# Patient Record
Sex: FEMALE | Race: WHITE | NOT HISPANIC OR LATINO | Employment: FULL TIME | URBAN - NONMETROPOLITAN AREA
[De-identification: names, ages, dates, MRNs, and addresses within clinical notes are randomized per-mention and may not be internally consistent; named-entity substitution may affect disease eponyms.]

---

## 2017-10-12 ENCOUNTER — TELEPHONE (OUTPATIENT)
Dept: CARDIOLOGY | Facility: CLINIC | Age: 27
End: 2017-10-12

## 2017-10-12 NOTE — TELEPHONE ENCOUNTER
Per Opal Rogers, APRN: she can cut atenolol 1/2 for 3 days and then she can stop. If she does not have palpitations after stopping medications she can stay off any type of medications.  If she starts having palpitations she needs to call our office so we can start her on something else.

## 2017-10-12 NOTE — TELEPHONE ENCOUNTER
----- Message from Toya Redding LPN sent at 10/12/2017  4:53 PM EDT -----   Patient is requesting to speak with someone about one of her medications.

## 2017-11-08 ENCOUNTER — LAB REQUISITION (OUTPATIENT)
Dept: LAB | Facility: HOSPITAL | Age: 27
End: 2017-11-08

## 2017-11-08 DIAGNOSIS — K80.20 CALCULUS OF GALLBLADDER WITHOUT CHOLECYSTITIS WITHOUT OBSTRUCTION: ICD-10-CM

## 2017-11-08 PROCEDURE — 88304 TISSUE EXAM BY PATHOLOGIST: CPT | Performed by: SURGERY

## 2017-11-10 LAB
CYTO UR: NORMAL
LAB AP CASE REPORT: NORMAL
LAB AP CLINICAL INFORMATION: NORMAL
Lab: NORMAL
PATH REPORT.FINAL DX SPEC: NORMAL
PATH REPORT.GROSS SPEC: NORMAL

## 2017-12-18 ENCOUNTER — OFFICE VISIT (OUTPATIENT)
Dept: CARDIOLOGY | Facility: CLINIC | Age: 27
End: 2017-12-18

## 2017-12-18 VITALS
OXYGEN SATURATION: 96 % | HEART RATE: 80 BPM | SYSTOLIC BLOOD PRESSURE: 104 MMHG | WEIGHT: 131.2 LBS | HEIGHT: 65 IN | DIASTOLIC BLOOD PRESSURE: 67 MMHG | BODY MASS INDEX: 21.86 KG/M2

## 2017-12-18 DIAGNOSIS — R00.2 PALPITATIONS: Primary | ICD-10-CM

## 2017-12-18 DIAGNOSIS — R00.0 TACHYCARDIA: ICD-10-CM

## 2017-12-18 PROBLEM — G35 MS (MULTIPLE SCLEROSIS) (HCC): Status: ACTIVE | Noted: 2017-12-18

## 2017-12-18 PROCEDURE — 93000 ELECTROCARDIOGRAM COMPLETE: CPT | Performed by: NURSE PRACTITIONER

## 2017-12-18 PROCEDURE — 99212 OFFICE O/P EST SF 10 MIN: CPT | Performed by: NURSE PRACTITIONER

## 2017-12-18 RX ORDER — BIOTIN 10 MG
TABLET ORAL
COMMUNITY
End: 2019-08-22 | Stop reason: SDDI

## 2017-12-18 RX ORDER — ATENOLOL 25 MG/1
25 TABLET ORAL DAILY
Qty: 90 TABLET | Refills: 3 | Status: SHIPPED | OUTPATIENT
Start: 2017-12-18 | End: 2018-08-08 | Stop reason: SDUPTHER

## 2017-12-18 RX ORDER — LANOLIN ALCOHOL/MO/W.PET/CERES
400 CREAM (GRAM) TOPICAL 2 TIMES DAILY
COMMUNITY

## 2017-12-18 RX ORDER — MELATONIN
2000 DAILY
COMMUNITY

## 2017-12-18 RX ORDER — DIMETHYL FUMARATE 240 MG/1
240 CAPSULE ORAL 2 TIMES DAILY
COMMUNITY
Start: 2017-12-04

## 2017-12-18 NOTE — PATIENT INSTRUCTIONS
Palpitations  A palpitation is the feeling that your heartbeat is irregular or is faster than normal. It may feel like your heart is fluttering or skipping a beat. Palpitations are usually not a serious problem. They may be caused by many things, including smoking, caffeine, alcohol, stress, and certain medicines. Although most causes of palpitations are not serious, palpitations can be a sign of a serious medical problem. In some cases, you may need further medical evaluation.  HOME CARE INSTRUCTIONS  Pay attention to any changes in your symptoms. Take these actions to help with your condition:  · Avoid the following:    Caffeinated coffee, tea, soft drinks, diet pills, and energy drinks.    Chocolate.    Alcohol.  · Do not use any tobacco products, such as cigarettes, chewing tobacco, and e-cigarettes. If you need help quitting, ask your health care provider.  · Try to reduce your stress and anxiety. Things that can help you relax include:    Yoga.    Meditation.    Physical activity, such as swimming, jogging, or walking.    Biofeedback. This is a method that helps you learn to use your mind to control things in your body, such as your heartbeats.  · Get plenty of rest and sleep.  · Take over-the-counter and prescription medicines only as told by your health care provider.  · Keep all follow-up visits as told by your health care provider. This is important.  SEEK MEDICAL CARE IF:  · You continue to have a fast or irregular heartbeat after 24 hours.  · Your palpitations occur more often.  SEEK IMMEDIATE MEDICAL CARE IF:  · You have chest pain or shortness of breath.  · You have a severe headache.  · You feel dizzy or you faint.     This information is not intended to replace advice given to you by your health care provider. Make sure you discuss any questions you have with your health care provider.     Document Released: 12/15/2001 Document Revised: 04/10/2017 Document Reviewed: 09/01/2016  [employer]  Patient Education ©2017 [employer] Inc.  Multiple Sclerosis  Multiple sclerosis (MS) is a disease of the central nervous system. It leads to the loss of the insulating covering of the nerves (myelin sheath) of your brain. When this happens, brain signals do not get sent properly or may not get sent at all. The age of onset of MS varies.   CAUSES  The cause of MS is unknown. However, it is more common in the [place] than in the southern [place].  RISK FACTORS  There is a higher number of women with MS than men. MS is not an illness that is passed down to you from your family members (inherited). However, your risk of MS is higher if you have a relative with MS.  SIGNS AND SYMPTOMS   The symptoms of MS occur in episodes or attacks. These attacks may last weeks to months. There may be long periods of almost no symptoms between attacks. The symptoms of MS vary. This is because of the many different ways it affects the central nervous system. The main symptoms of MS include:  · Vision problems and eye pain.  · Numbness.  · Weakness.  · Inability to move your arms, hands, feet, or legs (paralysis).  · Balance problems.  · Tremors.  DIAGNOSIS   Your health care provider can diagnose MS with the help of imaging exams and lab tests. These may include specialized X-ray exams and spinal fluid tests. The best imaging exam to confirm a diagnosis of MS is an MRI.  TREATMENT   There is no known cure for MS, but there are medicines that can decrease the number and frequency of attacks. Steroids are often used for short-term relief. Physical and occupational therapy may also help. There are also many new alternative or complementary treatments available to help control the symptoms of MS. Ask your health care provider if any of these other options are right for you.  HOME CARE INSTRUCTIONS   · Take medicines as directed by your health care provider.  · Exercise as directed by your health care provider.  SEEK  MEDICAL CARE IF:  You begin to feel depressed.  SEEK IMMEDIATE MEDICAL CARE IF:  · You develop paralysis.  · You have problems with bladder, bowel, or sexual function.  · You develop mental changes, such as forgetfulness or mood swings.  · You have a period of uncontrolled movements (seizure).     This information is not intended to replace advice given to you by your health care provider. Make sure you discuss any questions you have with your health care provider.     Document Released: 12/15/2001 Document Revised: 12/23/2014 Document Reviewed: 08/25/2014  [employer] Interactive Patient Education ©2017 [employer]

## 2017-12-18 NOTE — PROGRESS NOTES
Subjective   Mike Aragon is a 27 y.o. female     Chief Complaint   Patient presents with   • Palpitations     presents for a follow up       HPI    Problem List:    1. MVP, per patient report  1.1 Echo 6/21/16 - EF 55-60%; trace MR and TR  2. Atypical Chest Pain  2.1 Stress Test 6/21/16 - no ischemia; average exercise   3. Palpitations  3.1 Event Monitor 5/10-5/23/16 - NSR, sinus arrhythmia, ST   4. Shortness of breath with exertion  5. MS, Dr. Gerald Tracy, DX 4/2017    Patient is a 27-year-old female who presents today for follow-up.  She denies any chest pain, pressure, palpitations, fluttering, presyncope, syncope, orthopnea, PND or edema.  She said she did have a little bit of dizziness a few weeks ago and has a history of vertigo.  She says it has since resolved.  She denies any shortness of breath with activity.  PCP monitors her cholesterol.  Patient's mother past away and made due to a brain aneurysm.  Patient herself, was diagnosed with MS in April of this year.  She says it started with feet numbness and then tingling in her legs.  She says she would have a electrical pain when she bent her neck.      Current Outpatient Prescriptions   Medication Sig Dispense Refill   • atenolol (TENORMIN) 25 MG tablet Take 1 tablet by mouth Daily. 90 tablet 3   • Biotin 10 MG tablet Take  by mouth.     • cholecalciferol (VITAMIN D3) 1000 units tablet Take 1,000 Units by mouth Daily.     • folic acid (FOLVITE) 400 MCG tablet Take 400 mcg by mouth Daily.     • nitroglycerin (NITROSTAT) 0.4 MG SL tablet 1 under the tongue as needed for angina, may repeat q5mins for up three doses 100 tablet 11   • SPRINTEC 28 0.25-35 MG-MCG per tablet Take 1 tablet by mouth daily.     • TECFIDERA 240 MG capsule delayed-release Take 240 mg by mouth 2 (Two) Times a Day.       No current facility-administered medications for this visit.        ALLERGIES    Macrobid [nitrofurantoin monohyd macro] and Polytrim [polymyxin  "b-trimethoprim]    Past Medical History:   Diagnosis Date   • Mitral valve prolapse        Social History     Social History   • Marital status: Single     Spouse name: N/A   • Number of children: N/A   • Years of education: N/A     Occupational History   • Not on file.     Social History Main Topics   • Smoking status: Never Smoker   • Smokeless tobacco: Never Used   • Alcohol use Yes      Comment: Socially twice a month   • Drug use: No   • Sexual activity: Not on file     Other Topics Concern   • Not on file     Social History Narrative       Family History   Problem Relation Age of Onset   • Hypertension Mother    • No Known Problems Father        Review of Systems   Constitutional: Negative for diaphoresis and fatigue.   HENT: Negative for rhinorrhea, sinus pressure and sneezing.    Eyes: Negative for visual disturbance.   Respiratory: Negative for chest tightness and shortness of breath.    Cardiovascular: Negative for chest pain, palpitations and leg swelling.   Gastrointestinal: Positive for diarrhea. Negative for constipation, nausea and vomiting.   Endocrine: Negative.    Genitourinary: Negative for difficulty urinating.   Musculoskeletal: Positive for arthralgias, back pain and neck pain. Negative for myalgias.        Diagnosed with MS April 2017   Skin: Negative.    Allergic/Immunologic: Positive for environmental allergies.   Neurological: Positive for dizziness (few weeks ago, history of vertigo ) and numbness (feet and tingling in legs). Negative for syncope and headaches.   Hematological: Does not bruise/bleed easily.   Psychiatric/Behavioral: The patient is nervous/anxious.        Objective   /67 (BP Location: Left arm, Patient Position: Sitting)  Pulse 80  Ht 165.1 cm (65\")  Wt 59.5 kg (131 lb 3.2 oz)  SpO2 96%  BMI 21.83 kg/m2  Vitals:    12/18/17 0904   BP: 104/67   BP Location: Left arm   Patient Position: Sitting   Pulse: 80   SpO2: 96%   Weight: 59.5 kg (131 lb 3.2 oz)   Height: " "165.1 cm (65\")      Lab Results (most recent)     None        Physical Exam   Constitutional: She is oriented to person, place, and time. Vital signs are normal. She appears well-developed and well-nourished. She is active and cooperative.   HENT:   Head: Normocephalic.   Eyes: Lids are normal.   Neck: Normal carotid pulses, no hepatojugular reflux and no JVD present. Carotid bruit is not present.   Cardiovascular: Normal rate, regular rhythm and normal heart sounds.    Pulses:       Radial pulses are 2+ on the right side, and 2+ on the left side.        Dorsalis pedis pulses are 2+ on the right side, and 2+ on the left side.        Posterior tibial pulses are 2+ on the right side, and 2+ on the left side.   No edema BLE.    Pulmonary/Chest: Effort normal and breath sounds normal.   Abdominal: Normal appearance and bowel sounds are normal.   Neurological: She is alert and oriented to person, place, and time.   Skin: Skin is warm, dry and intact.   Psychiatric: She has a normal mood and affect. Her speech is normal and behavior is normal. Judgment and thought content normal. Cognition and memory are normal.       Procedure     ECG 12 Lead  Date/Time: 12/18/2017 9:07 AM  Performed by: JOCELYN VELA  Authorized by: JOCELYN VELA   Comparison: compared with previous ECG from 5/9/2016  Similar to previous ECG  Rhythm: sinus rhythm  Rhythm comments: sinus arrythmia   Rate: normal  BPM: 68  QRS axis: normal  Clinical impression: non-specific ECG  Comments: Palpitations                    Assessment/Plan      Diagnosis Plan   1. Palpitations  ECG 12 Lead    atenolol (TENORMIN) 25 MG tablet   2. Tachycardia         Return in about 1 year (around 12/18/2018).       outpatient/tachycardia-patient is on atenolol.  She will continue her medication regimen.  She'll follow-up in one year or sooner if any changes.    Electronically signed by:        "

## 2018-05-07 ENCOUNTER — CLINICAL SUPPORT (OUTPATIENT)
Dept: CARDIOLOGY | Facility: CLINIC | Age: 28
End: 2018-05-07

## 2018-05-07 VITALS
DIASTOLIC BLOOD PRESSURE: 66 MMHG | BODY MASS INDEX: 21.66 KG/M2 | HEART RATE: 78 BPM | SYSTOLIC BLOOD PRESSURE: 98 MMHG | HEIGHT: 65 IN | OXYGEN SATURATION: 98 % | WEIGHT: 130 LBS

## 2018-05-07 DIAGNOSIS — R00.2 PALPITATIONS: Primary | ICD-10-CM

## 2018-05-07 NOTE — PROGRESS NOTES
Mike NEWTON Margo  1990 5/8/2018   ?   Chief Complaint   Patient presents with   • Palpitations     presents as a nurse visit       ?   HPI: patient walked into the office today with being very fatigued. Patient states she was seen in the ER for palpitations and elevated heart rate. The ER  Told her to take an extra 1/2 tablet of her atenolol to see if it will help. DINA Matthews wants an ekg while the patient is here today.   ?   ?   ?     Current Outpatient Prescriptions:   •  atenolol (TENORMIN) 25 MG tablet, Take 1 tablet by mouth Daily., Disp: 90 tablet, Rfl: 3  •  Biotin 10 MG tablet, Take  by mouth., Disp: , Rfl:   •  cholecalciferol (VITAMIN D3) 1000 units tablet, Take 1,000 Units by mouth Daily., Disp: , Rfl:   •  folic acid (FOLVITE) 400 MCG tablet, Take 400 mcg by mouth Daily., Disp: , Rfl:   •  nitroglycerin (NITROSTAT) 0.4 MG SL tablet, 1 under the tongue as needed for angina, may repeat q5mins for up three doses, Disp: 100 tablet, Rfl: 11  •  SPRINTEC 28 0.25-35 MG-MCG per tablet, Take 1 tablet by mouth daily., Disp: , Rfl:   •  TECFIDERA 240 MG capsule delayed-release, Take 240 mg by mouth 2 (Two) Times a Day., Disp: , Rfl:    ?   ?   Macrobid [nitrofurantoin monohyd macro] and Polytrim [polymyxin b-trimethoprim]         ECG 12 Lead  Date/Time: 5/8/2018 7:45 AM  Performed by: JOCELYN VELA  Authorized by: JOCELYN VELA   Comparison: not compared with previous ECG   Rhythm: sinus rhythm  Rate: normal  BPM: 63  QRS axis: normal  Clinical impression: normal ECG             ?   Assessment/Plan  Per DINA Matthews; patient is to stay on her atenolol 25mg. She is to take a tablet in am and if her systolic bp is 115 and above then she is to take a whole tablet in pm. If it's less than 115 then she is to take 1/2 tablet in pm. We will also order a 2 week cardiac even monitor for the patient. Patient states she has very sensitive skin.  She will follow-up in 12 weeks or sooner if any  changes.  Patient is unsure if this is her heart or just stress as this past week was one year from when her mother , suddenly.    ?   ?   ?

## 2018-05-08 PROCEDURE — 93000 ELECTROCARDIOGRAM COMPLETE: CPT | Performed by: NURSE PRACTITIONER

## 2018-05-23 ENCOUNTER — OUTSIDE FACILITY SERVICE (OUTPATIENT)
Dept: CARDIOLOGY | Facility: CLINIC | Age: 28
End: 2018-05-23

## 2018-05-23 PROCEDURE — 93272 ECG/REVIEW INTERPRET ONLY: CPT | Performed by: INTERNAL MEDICINE

## 2018-05-29 ENCOUNTER — TELEPHONE (OUTPATIENT)
Dept: CARDIOLOGY | Facility: CLINIC | Age: 28
End: 2018-05-29

## 2018-05-29 NOTE — TELEPHONE ENCOUNTER
PATIENT IS AWARE OF MONITOR RESULTS.  PATIENT VERBALIZED UNDERSTANDING AND WAS ENCOURAGED TO KEEP FOLLOW UP APPOINTMENT. THERESA ARZATE

## 2018-08-08 ENCOUNTER — OFFICE VISIT (OUTPATIENT)
Dept: CARDIOLOGY | Facility: CLINIC | Age: 28
End: 2018-08-08

## 2018-08-08 VITALS
OXYGEN SATURATION: 97 % | DIASTOLIC BLOOD PRESSURE: 67 MMHG | SYSTOLIC BLOOD PRESSURE: 104 MMHG | HEART RATE: 66 BPM | BODY MASS INDEX: 21.99 KG/M2 | HEIGHT: 65 IN | WEIGHT: 132 LBS

## 2018-08-08 DIAGNOSIS — R00.2 PALPITATIONS: Primary | ICD-10-CM

## 2018-08-08 DIAGNOSIS — R00.0 TACHYCARDIA: ICD-10-CM

## 2018-08-08 PROCEDURE — 99212 OFFICE O/P EST SF 10 MIN: CPT | Performed by: NURSE PRACTITIONER

## 2018-08-08 RX ORDER — ATENOLOL 25 MG/1
25 TABLET ORAL DAILY
Qty: 90 TABLET | Refills: 3 | Status: SHIPPED | OUTPATIENT
Start: 2018-08-08 | End: 2019-08-22 | Stop reason: SDUPTHER

## 2018-08-08 NOTE — PATIENT INSTRUCTIONS
Palpitations  A palpitation is the feeling that your heartbeat is irregular or is faster than normal. It may feel like your heart is fluttering or skipping a beat. Palpitations are usually not a serious problem. They may be caused by many things, including smoking, caffeine, alcohol, stress, and certain medicines. Although most causes of palpitations are not serious, palpitations can be a sign of a serious medical problem. In some cases, you may need further medical evaluation.  Follow these instructions at home:  Pay attention to any changes in your symptoms. Take these actions to help with your condition:  · Avoid the following:  ? Caffeinated coffee, tea, soft drinks, diet pills, and energy drinks.  ? Chocolate.  ? Alcohol.  · Do not use any tobacco products, such as cigarettes, chewing tobacco, and e-cigarettes. If you need help quitting, ask your health care provider.  · Try to reduce your stress and anxiety. Things that can help you relax include:  ? Yoga.  ? Meditation.  ? Physical activity, such as swimming, jogging, or walking.  ? Biofeedback. This is a method that helps you learn to use your mind to control things in your body, such as your heartbeats.  · Get plenty of rest and sleep.  · Take over-the-counter and prescription medicines only as told by your health care provider.  · Keep all follow-up visits as told by your health care provider. This is important.    Contact a health care provider if:  · You continue to have a fast or irregular heartbeat after 24 hours.  · Your palpitations occur more often.  Get help right away if:  · You have chest pain or shortness of breath.  · You have a severe headache.  · You feel dizzy or you faint.  This information is not intended to replace advice given to you by your health care provider. Make sure you discuss any questions you have with your health care provider.  Document Released: 12/15/2001 Document Revised: 05/22/2017 Document Reviewed: 09/01/2016  [employer]  Interactive Patient Education © 2018 [employer]

## 2018-08-08 NOTE — PROGRESS NOTES
Subjective   Mike Aragon is a 28 y.o. female     Chief Complaint   Patient presents with   • Palpitations     presens as a follow up       HPI    Problem List:    1. MVP, per patient report  1.1 Echo 6/21/16 - EF 55-60%; trace MR and TR  2. Atypical Chest Pain  2.1 Stress Test 6/21/16 - no ischemia; average exercise   3. Palpitations  3.1 Event Monitor 5/10-5/23/16 - NSR, sinus arrhythmia, ST   3.2 event monitor 5/10-5/23/18-sinus rhythm, sinus arrhythmia  4. Shortness of breath with exertion  5. MS, Dr. Gerald Tracy, DX 4/2017    Patient is a 28-year-old female who presents today for follow-up.  She denies any chest pain, pressure, dizziness, presyncope, syncope, orthopnea, PND or edema.  She says she still has a palpitations but now she really thinks it's due to anxiety.  She says that the beta blocker does really help her.  She denies any shortness of breath with activity.  PCP monitors her cholesterol.  She got  this summer.    Current Outpatient Prescriptions   Medication Sig Dispense Refill   • atenolol (TENORMIN) 25 MG tablet Take 1 tablet by mouth Daily. 90 tablet 3   • Biotin 10 MG tablet Take  by mouth.     • cholecalciferol (VITAMIN D3) 1000 units tablet Take 2,000 Units by mouth Daily.     • folic acid (FOLVITE) 400 MCG tablet Take 400 mcg by mouth 2 (Two) Times a Day.     • SPRINTEC 28 0.25-35 MG-MCG per tablet Take 1 tablet by mouth daily.     • TECFIDERA 240 MG capsule delayed-release Take 240 mg by mouth 2 (Two) Times a Day.       No current facility-administered medications for this visit.        ALLERGIES    Macrobid [nitrofurantoin monohyd macro] and Polytrim [polymyxin b-trimethoprim]    Past Medical History:   Diagnosis Date   • Mitral valve prolapse        Social History     Social History   • Marital status: Single     Spouse name: N/A   • Number of children: N/A   • Years of education: N/A     Occupational History   • Not on file.     Social History Main Topics   • Smoking  "status: Never Smoker   • Smokeless tobacco: Never Used   • Alcohol use Yes      Comment: Socially twice a month   • Drug use: No   • Sexual activity: Not on file     Other Topics Concern   • Not on file     Social History Narrative   • No narrative on file       Family History   Problem Relation Age of Onset   • Hypertension Mother    • No Known Problems Father        Review of Systems   Constitutional: Positive for fatigue. Negative for diaphoresis.   HENT: Negative for rhinorrhea and sneezing.    Eyes: Negative for visual disturbance.   Respiratory: Negative for chest tightness and shortness of breath.    Cardiovascular: Positive for palpitations (thinks some of it is anxiety ). Negative for chest pain and leg swelling.   Gastrointestinal: Positive for diarrhea. Negative for constipation, nausea and vomiting.   Endocrine: Negative.    Genitourinary: Negative for difficulty urinating.   Musculoskeletal: Negative for arthralgias, back pain, myalgias and neck pain.   Skin: Negative.    Allergic/Immunologic: Negative for environmental allergies and food allergies.   Neurological: Negative for dizziness, syncope and light-headedness.   Hematological: Does not bruise/bleed easily.   Psychiatric/Behavioral: The patient is nervous/anxious.        Objective   /67 (BP Location: Left arm, Patient Position: Sitting)   Pulse 66   Ht 165.1 cm (65\")   Wt 59.9 kg (132 lb)   SpO2 97%   BMI 21.97 kg/m²   Vitals:    08/08/18 1534   BP: 104/67   BP Location: Left arm   Patient Position: Sitting   Pulse: 66   SpO2: 97%   Weight: 59.9 kg (132 lb)   Height: 165.1 cm (65\")      Lab Results (most recent)     None        Physical Exam   Constitutional: She is oriented to person, place, and time. Vital signs are normal. She appears well-developed and well-nourished. She is active and cooperative.   HENT:   Head: Normocephalic.   Eyes: Lids are normal.   Neck: Normal carotid pulses, no hepatojugular reflux and no JVD present. " Carotid bruit is not present.   Cardiovascular: Normal rate, regular rhythm and normal heart sounds.    Pulses:       Radial pulses are 2+ on the right side, and 2+ on the left side.        Dorsalis pedis pulses are 2+ on the right side, and 2+ on the left side.        Posterior tibial pulses are 2+ on the right side, and 2+ on the left side.   No edema BLE.   Pulmonary/Chest: Effort normal and breath sounds normal.   Abdominal: Normal appearance and bowel sounds are normal.   Neurological: She is alert and oriented to person, place, and time.   Skin: Skin is warm, dry and intact.   Psychiatric: She has a normal mood and affect. Her speech is normal and behavior is normal. Judgment and thought content normal. Cognition and memory are normal.       Procedure   Procedures         Assessment/Plan      Diagnosis Plan   1. Palpitations  atenolol (TENORMIN) 25 MG tablet   2. Tachycardia         Return in about 1 year (around 8/8/2019).       palpitations/tachycardia-patient is doing well.  She will continue her medication regimen.  She'll follow-up in one year or sooner if any changes.        Patient's Body mass index is 21.97 kg/m². BMI is within normal parameters. No follow-up required.      Electronically signed by:

## 2018-09-05 ENCOUNTER — TELEPHONE (OUTPATIENT)
Dept: CARDIOLOGY | Facility: CLINIC | Age: 28
End: 2018-09-05

## 2018-09-05 NOTE — TELEPHONE ENCOUNTER
PATIENT TAKES HER ATENOLOL 25 MG AT NIGHT AND AFTER SHE TAKES IT HER H/R GOES DOWN TO 55 AND FEELS A LITTLE FUNNY. SHE WANTED TO KNOW IF SHE COULD SPLIT IT IN HALF AND TAKE IT BID. I TOLD HER THIS WOULD BE FINE, THIS WOULD HELP FROM IT GOING TO LOW AT NIGHT AND WILL GIVE HER FULL 24 HOUR COVERAGE. STATED SHE WOULD TRY THIS. PH,MARTYN

## 2018-10-04 ENCOUNTER — TELEPHONE (OUTPATIENT)
Dept: CARDIOLOGY | Facility: CLINIC | Age: 28
End: 2018-10-04

## 2018-10-04 NOTE — TELEPHONE ENCOUNTER
PATIENT IS WANTING TO STOP HER ATENOLOL COMPLETELY, STATES HAS BEEN ON IT FOR SO LONG THAT SHE DOESN'T EVEN KNOW IF SHE NEEDS IT. STATES SHE HAS ONLY BEEN TAKING 12.5 MG FOR 3.5 WEEKS NOW. STATES SHE IS GETTING READY TO START CELEXA 20 MG AND THINKS THAT ANXIETY MAY BE HER PROBLEM AND IS WHAT IS CAUSING THE NEED FOR TH ATENOLOL. IF THIS IS THE CASE SHE CAN TREAT THE ANXIETY WITH CELEXA AND NOT NEED TO ATENOLOL. V/O PER JOCELYN VELA, NP CAN EITHER GO AHEAD AND STOP THE ATENOLOL OR CAN TAKE THE CELEXA FIRST TAKE IT FOR A WEEK THEN D/C THE ATENOLOL, AND TO CONTACT THE OFFICE WITH ANY RE-OCCURENCE OF SX'S AFTER DC'ING ATENOLOL. VERBALIZED OK. PH,LPN

## 2019-08-22 ENCOUNTER — OFFICE VISIT (OUTPATIENT)
Dept: CARDIOLOGY | Facility: CLINIC | Age: 29
End: 2019-08-22

## 2019-08-22 VITALS
BODY MASS INDEX: 26.42 KG/M2 | HEIGHT: 65 IN | HEART RATE: 73 BPM | WEIGHT: 158.6 LBS | DIASTOLIC BLOOD PRESSURE: 77 MMHG | SYSTOLIC BLOOD PRESSURE: 118 MMHG | OXYGEN SATURATION: 100 %

## 2019-08-22 DIAGNOSIS — R00.2 PALPITATIONS: Primary | ICD-10-CM

## 2019-08-22 DIAGNOSIS — R00.0 TACHYCARDIA: ICD-10-CM

## 2019-08-22 DIAGNOSIS — R06.02 SHORTNESS OF BREATH ON EXERTION: ICD-10-CM

## 2019-08-22 PROCEDURE — 99213 OFFICE O/P EST LOW 20 MIN: CPT | Performed by: NURSE PRACTITIONER

## 2019-08-22 PROCEDURE — 93000 ELECTROCARDIOGRAM COMPLETE: CPT | Performed by: NURSE PRACTITIONER

## 2019-08-22 RX ORDER — ATENOLOL 25 MG/1
25 TABLET ORAL DAILY PRN
Qty: 30 TABLET | Refills: 11 | Status: SHIPPED | OUTPATIENT
Start: 2019-08-22 | End: 2021-02-23

## 2019-08-22 RX ORDER — FLUOXETINE HYDROCHLORIDE 40 MG/1
40 CAPSULE ORAL DAILY
COMMUNITY

## 2019-08-22 NOTE — PROGRESS NOTES
Subjective   Mike Aragon is a 29 y.o. female     Chief Complaint   Patient presents with   • Follow-up     Pt in office for 1yr follow up appt        HPI    Problem List:    1. MVP, per patient report  1.1 Echo 6/21/16 - EF 55-60%; trace MR and TR  2. Atypical Chest Pain  2.1 Stress Test 6/21/16 - no ischemia; average exercise   3. Palpitations  3.1 Event Monitor 5/10-5/23/16 - NSR, sinus arrhythmia, ST   3.2 event monitor 5/10-5/23/18-sinus rhythm, sinus arrhythmia  4. Shortness of breath with exertion  5. MS, Dr. Gerald Tracy, DX 4/2017    Patient is a 29-year-old female who presents today for follow-up.  She denies any chest pain, pressure, dizziness, presyncope, syncope, orthopnea, PND or edema.  She says that she has an occasional palpitation and when she does she will use her atenolol.  She says she typically never takes more than a half a tablet.  She denies any shortness of breath with activity.  She says that overall she is felt well.  She does follow-up with her doctor in [place] regarding her MS and feels like she is pretty stable.    Current Outpatient Medications   Medication Sig Dispense Refill   • atenolol (TENORMIN) 25 MG tablet Take 1 tablet by mouth Daily As Needed (palpitations). 30 tablet 11   • cholecalciferol (VITAMIN D3) 1000 units tablet Take 2,000 Units by mouth Daily.     • FLUoxetine (PROZAC) 40 MG capsule Take 40 mg by mouth Daily.     • folic acid (FOLVITE) 400 MCG tablet Take 400 mcg by mouth 2 (Two) Times a Day.     • SPRINTEC 28 0.25-35 MG-MCG per tablet Take 1 tablet by mouth daily.     • TECFIDERA 240 MG capsule delayed-release Take 240 mg by mouth 2 (Two) Times a Day.       No current facility-administered medications for this visit.        ALLERGIES    Macrobid [nitrofurantoin monohyd macro] and Polytrim [polymyxin b-trimethoprim]    Past Medical History:   Diagnosis Date   • Mitral valve prolapse        Social History     Socioeconomic History   • Marital status: Single  "    Spouse name: Not on file   • Number of children: Not on file   • Years of education: Not on file   • Highest education level: Not on file   Tobacco Use   • Smoking status: Never Smoker   • Smokeless tobacco: Never Used   Substance and Sexual Activity   • Alcohol use: Yes     Comment: Socially twice a month   • Drug use: No       Family History   Problem Relation Age of Onset   • Hypertension Mother    • No Known Problems Father        Review of Systems   Constitutional: Positive for fatigue (no change ). Negative for diaphoresis.   HENT: Negative for congestion, rhinorrhea and sore throat.    Eyes: Negative for visual disturbance.   Respiratory: Negative for chest tightness and shortness of breath.    Cardiovascular: Positive for palpitations (occasionally). Negative for chest pain (Denies CP ) and leg swelling.   Gastrointestinal: Positive for diarrhea (back and forth ) and nausea (back and forth ). Negative for abdominal pain, blood in stool, constipation and vomiting.   Endocrine: Positive for heat intolerance (always hot ). Negative for cold intolerance.   Genitourinary: Negative for difficulty urinating, dysuria, frequency, hematuria and urgency.   Musculoskeletal: Negative for back pain and neck pain.   Skin: Negative for rash and wound.   Allergic/Immunologic: Negative for environmental allergies and food allergies.   Neurological: Negative for dizziness, syncope, weakness, light-headedness, numbness and headaches.   Hematological: Bruises/bleeds easily (bruises).   Psychiatric/Behavioral: Negative for sleep disturbance.       Objective   /77   Pulse 73   Ht 165.1 cm (65\")   Wt 71.9 kg (158 lb 9.6 oz)   SpO2 100%   BMI 26.39 kg/m²   Vitals:    08/22/19 1455   BP: 118/77   Pulse: 73   SpO2: 100%   Weight: 71.9 kg (158 lb 9.6 oz)   Height: 165.1 cm (65\")      Lab Results (most recent)     None        Physical Exam   Constitutional: She is oriented to person, place, and time. Vital signs are " normal. She appears well-developed and well-nourished. She is active and cooperative.   HENT:   Head: Normocephalic.   Eyes: Lids are normal.   Neck: Normal carotid pulses, no hepatojugular reflux and no JVD present. Carotid bruit is not present.   Cardiovascular: Normal rate, regular rhythm and normal heart sounds.   Pulses:       Radial pulses are 2+ on the right side, and 2+ on the left side.        Dorsalis pedis pulses are 2+ on the right side, and 2+ on the left side.        Posterior tibial pulses are 2+ on the right side, and 2+ on the left side.   No edema BLE.   Pulmonary/Chest: Effort normal and breath sounds normal.   Abdominal: Normal appearance and bowel sounds are normal.   Neurological: She is alert and oriented to person, place, and time.   Skin: Skin is warm, dry and intact.   Psychiatric: She has a normal mood and affect. Her speech is normal and behavior is normal. Judgment and thought content normal. Cognition and memory are normal.       Procedure     ECG 12 Lead  Date/Time: 8/22/2019 3:32 PM  Performed by: Opal Rogers APRN  Authorized by: Opal Rogers APRN   Comparison: compared with previous ECG from 5/7/2018  Similar to previous ECG  Rhythm: sinus rhythm  Rate: normal  BPM: 79  Conduction: non-specific intraventricular conduction delay  QRS axis: normal    Clinical impression: non-specific ECG                 Assessment/Plan      Diagnosis Plan   1. Palpitations  ECG 12 Lead    atenolol (TENORMIN) 25 MG tablet   2. Tachycardia  ECG 12 Lead   3. Shortness of breath on exertion         Return in about 1 year (around 8/22/2020).    Palpitations/tachycardia-patient will use atenolol as needed.  Shortness of breath-resolved.  She will continue her medication regimen.  She will follow-up in 1 year or sooner if any changes.  Patient does plan on possibly getting pregnant sometime after the new year which I told her at that time we have to switch her to labetalol.           Patient's Body  mass index is 26.39 kg/m². BMI is above normal parameters. Recommendations include: educational material.      Electronically signed by:

## 2019-08-22 NOTE — PATIENT INSTRUCTIONS
"Fat and Cholesterol Restricted Eating Plan  Getting too much fat and cholesterol in your diet may cause health problems. Choosing the right foods helps keep your fat and cholesterol at normal levels. This can keep you from getting certain diseases.  Your doctor may recommend an eating plan that includes:  · Total fat: ______% or less of total calories a day.  · Saturated fat: ______% or less of total calories a day.  · Cholesterol: less than _________mg a day.  · Fiber: ______g a day.  What are tips for following this plan?  General tips    · Work with your doctor to lose weight if you need to.  · Avoid:  ? Foods with added sugar.  ? Fried foods.  ? Foods with partially hydrogenated oils.  · Limit alcohol intake to no more than 1 drink a day for nonpregnant women and 2 drinks a day for men. One drink equals 12 oz of beer, 5 oz of wine, or 1½ oz of hard liquor.  Reading food labels  · Check food labels for:  ? Trans fats.  ? Partially hydrogenated oils.  ? Saturated fat (g) in each serving.  ? Cholesterol (mg) in each serving.  ? Fiber (g) in each serving.  · Choose foods with healthy fats, such as:  ? Monounsaturated fats.  ? Polyunsaturated fats.  ? Omega-3 fats.  · Choose grain products that have whole grains. Look for the word \"whole\" as the first word in the ingredient list.  Cooking  · Cook foods using low-fat methods. These include baking, boiling, grilling, and broiling.  · Eat more home-cooked foods. Eat at restaurants and buffets less often.  · Avoid cooking using saturated fats, such as butter, cream, palm oil, palm kernel oil, and coconut oil.  Meal planning    · At meals, divide your plate into four equal parts:  ? Fill one-half of your plate with vegetables and green salads.  ? Fill one-fourth of your plate with whole grains.  ? Fill one-fourth of your plate with low-fat (lean) protein foods.  · Eat fish that is high in omega-3 fats at least two times a week. This includes mackerel, tuna, sardines, and " salmon.  · Eat foods that are high in fiber, such as whole grains, beans, apples, broccoli, carrots, peas, and barley.  Recommended foods  Grains  · Whole grains, such as whole wheat or whole grain breads, crackers, cereals, and pasta. Unsweetened oatmeal, bulgur, barley, quinoa, or brown rice. Corn or whole wheat flour tortillas.  Vegetables  · Fresh or frozen vegetables (raw, steamed, roasted, or grilled). Green salads.  Fruits  · All fresh, canned (in natural juice), or frozen fruits.  Meats and other protein foods  · Ground beef (85% or leaner), grass-fed beef, or beef trimmed of fat. Skinless chicken or turkey. Ground chicken or turkey. Pork trimmed of fat. All fish and seafood. Egg whites. Dried beans, peas, or lentils. Unsalted nuts or seeds. Unsalted canned beans. Nut butters without added sugar or oil.  Dairy  · Low-fat or nonfat dairy products, such as skim or 1% milk, 2% or reduced-fat cheeses, low-fat and fat-free ricotta or cottage cheese, or plain low-fat and nonfat yogurt.  Fats and oils  · Tub margarine without trans fats. Light or reduced-fat mayonnaise and salad dressings. Avocado. Olive, canola, sesame, or safflower oils.  The items listed above may not be a complete list of recommended foods or beverages. Contact your dietitian for more options.  The items listed above may not be a complete list of foods and beverages [you/your child] can eat. Contact a dietitian for more information.  Foods to avoid  Grains  · White bread. White pasta. White rice. Cornbread. Bagels, pastries, and croissants. Crackers and snack foods that contain trans fat and hydrogenated oils.  Vegetables  · Vegetables cooked in cheese, cream, or butter sauce. Fried vegetables.  Fruits  · Canned fruit in heavy syrup. Fruit in cream or butter sauce. Fried fruit.  Meats and other protein foods  · Fatty cuts of meat. Ribs, chicken wings, gill, sausage, bologna, salami, chitterlings, fatback, hot dogs, bratwurst, and packaged  lunch meats. Liver and organ meats. Whole eggs and egg yolks. Chicken and turkey with skin. Fried meat.  Dairy  · Whole or 2% milk, cream, half-and-half, and cream cheese. Whole milk cheeses. Whole-fat or sweetened yogurt. Full-fat cheeses. Nondairy creamers and whipped toppings. Processed cheese, cheese spreads, and cheese curds.  Beverages  · Alcohol. Sugar-sweetened drinks such as sodas, lemonade, and fruit drinks.  Fats and oils  · Butter, stick margarine, lard, shortening, ghee, or gill fat. Coconut, palm kernel, and palm oils.  Sweets and desserts  · Corn syrup, sugars, honey, and molasses. Candy. Jam and jelly. Syrup. Sweetened cereals. Cookies, pies, cakes, donuts, muffins, and ice cream.  The items listed above may not be a complete list of foods and beverages to avoid. Contact your dietitian for more information.  The items listed above may not be a complete list of foods and beverages [you/your child] should avoid. Contact a dietitian for more information.  Summary  · Choosing the right foods helps keep your fat and cholesterol at normal levels. This can keep you from getting certain diseases.  · At meals, fill one-half of your plate with vegetables and green salads.  · Eat high-fiber foods, like whole grains, beans, apples, carrots, peas, and barley.  · Limit added sugar, saturated fats, alcohol, and fried foods.  This information is not intended to replace advice given to you by your health care provider. Make sure you discuss any questions you have with your health care provider.  Document Released: 06/18/2013 Document Revised: 09/04/2018 Document Reviewed: 09/04/2018  [employer] Interactive Patient Education © 2019 [employer]  BMI for Adults    Body mass index (BMI) is a number that is calculated from a person's weight and height. BMI may help to estimate how much of a person's weight is composed of fat. BMI can help identify those who may be at higher risk for certain medical problems.  How is BMI  "used with adults?  BMI is used as a screening tool to identify possible weight problems. It is used to check whether a person is obese, overweight, healthy weight, or underweight.  How is BMI calculated?  BMI measures your weight and compares it to your height. This can be done either in English ([place] or metric measurements. Note that charts are available to help you find your BMI quickly and easily without having to do these calculations yourself.  To calculate your BMI in English ([place] measurements, your health care provider will:  1. Measure your weight in pounds (lb).  2. Multiply the number of pounds by 703.  ? For example, for a person who weighs 180 lb, multiply that number by 703, which equals 126,540.  3. Measure your height in inches (in). Then multiply that number by itself to get a measurement called \"inches squared.\"  ? For example, for a person who is 70 in tall, the \"inches squared\" measurement is 70 in x 70 in, which equals 4900 inches squared.  4. Divide the total from Step 2 (number of lb x 703) by the total from Step 3 (inches squared): 126,540 ÷ 4900 = 25.8. This is your BMI.  To calculate your BMI in metric measurements, your health care provider will:  1. Measure your weight in kilograms (kg).  2. Measure your height in meters (m). Then multiply that number by itself to get a measurement called \"meters squared.\"  ? For example, for a person who is 1.75 m tall, the \"meters squared\" measurement is 1.75 m x 1.75 m, which is equal to 3.1 meters squared.  3. Divide the number of kilograms (your weight) by the meters squared number. In this example: 70 ÷ 3.1 = 22.6. This is your BMI.  How is BMI interpreted?  To interpret your results, your health care provider will use BMI charts to identify whether you are underweight, normal weight, overweight, or obese. The following guidelines will be used:  · Underweight: BMI less than 18.5.  · Normal weight: BMI between 18.5 and 24.9.  · Overweight: BMI " between 25 and 29.9.  · Obese: BMI of 30 and above.  Please note:  · Weight includes both fat and muscle, so someone with a muscular build, such as an athlete, may have a BMI that is higher than 24.9. In cases like these, BMI is not an accurate measure of body fat.  · To determine if excess body fat is the cause of a BMI of 25 or higher, further assessments may need to be done by a health care provider.  · BMI is usually interpreted in the same way for men and women.  Why is BMI a useful tool?  BMI is useful in two ways:  · Identifying a weight problem that may be related to a medical condition, or that may increase the risk for medical problems.  · Promoting lifestyle and diet changes in order to reach a healthy weight.  Summary  · Body mass index (BMI) is a number that is calculated from a person's weight and height.  · BMI may help to estimate how much of a person's weight is composed of fat. BMI can help identify those who may be at higher risk for certain medical problems.  · BMI can be measured using English measurements or metric measurements.  · To interpret your results, your health care provider will use BMI charts to identify whether you are underweight, normal weight, overweight, or obese.  This information is not intended to replace advice given to you by your health care provider. Make sure you discuss any questions you have with your health care provider.  Document Released: 08/29/2005 Document Revised: 10/31/2018 Document Reviewed: 10/31/2018  [employer] Interactive Patient Education © 2019 [employer]    Palpitations  Palpitations are feelings that your heartbeat is irregular or is faster than normal. It may feel like your heart is fluttering or skipping a beat. Palpitations are usually not a serious problem. They may be caused by many things, including smoking, caffeine, alcohol, stress, and certain medicines or drugs. Most causes of palpitations are not serious. However, some palpitations can be a  sign of a serious problem. You may need further tests to rule out serious medical problems.  Follow these instructions at home:    Pay attention to any changes in your condition. Take these actions to help manage your symptoms:  Eating and drinking  · Avoid foods and drinks that may cause palpitations. These may include:  ? Caffeinated coffee, tea, soft drinks, diet pills, and energy drinks.  ? Chocolate.  ? Alcohol.  Lifestyle  · Take steps to reduce your stress and anxiety. Things that can help you relax include:  ? Yoga.  ? Mind-body activities, such as deep breathing, meditation, or using words and images to create positive thoughts (guided imagery).  ? Physical activity, such as swimming, jogging, or walking. Tell your health care provider if your palpitations increase with activity. If you have chest pain or shortness of breath with activity, do not continue the activity until you are seen by your health care provider.  ? Biofeedback. This is a method that helps you learn to use your mind to control things in your body, such as your heartbeat.  · Do not use drugs, including cocaine or ecstasy. Do not use marijuana.  · Get plenty of rest and sleep. Keep a regular bed time.  General instructions  · Take over-the-counter and prescription medicines only as told by your health care provider.  · Do not use any products that contain nicotine or tobacco, such as cigarettes and e-cigarettes. If you need help quitting, ask your health care provider.  · Keep all follow-up visits as told by your health care provider. This is important. These may include visits for further testing if palpitations do not go away or get worse.  Contact a health care provider if you:  · Continue to have a fast or irregular heartbeat after 24 hours.  · Notice that your palpitations occur more often.  Get help right away if you:  · Have chest pain or shortness of breath.  · Have a severe headache.  · Feel dizzy or you  faint.  Summary  · Palpitations are feelings that your heartbeat is irregular or is faster than normal. It may feel like your heart is fluttering or skipping a beat.  · Palpitations may be caused by many things, including smoking, caffeine, alcohol, stress, certain medicines, and drugs.  · Although most causes of palpitations are not serious, some causes can be a sign of a serious medical problem.  · Get help right away if you faint or have chest pain, shortness of breath, a severe headache, or dizziness.  This information is not intended to replace advice given to you by your health care provider. Make sure you discuss any questions you have with your health care provider.  Document Released: 12/15/2001 Document Revised: 01/30/2019 Document Reviewed: 01/30/2019  [employer] Interactive Patient Education © 2019 [employer]

## 2021-01-28 ENCOUNTER — TRANSCRIBE ORDERS (OUTPATIENT)
Dept: WOMENS IMAGING | Facility: HOSPITAL | Age: 31
End: 2021-01-28

## 2021-01-28 DIAGNOSIS — O28.3 ABNORMAL FETAL ULTRASOUND: Primary | ICD-10-CM

## 2021-01-28 DIAGNOSIS — O28.3 FETAL ECHOGENIC INTRACARDIAC FOCUS ON PRENATAL ULTRASOUND: ICD-10-CM

## 2021-02-23 ENCOUNTER — OFFICE VISIT (OUTPATIENT)
Dept: OBSTETRICS AND GYNECOLOGY | Facility: HOSPITAL | Age: 31
End: 2021-02-23

## 2021-02-23 ENCOUNTER — HOSPITAL ENCOUNTER (OUTPATIENT)
Dept: WOMENS IMAGING | Facility: HOSPITAL | Age: 31
Discharge: HOME | End: 2021-02-23
Admitting: OBSTETRICS & GYNECOLOGY

## 2021-02-23 VITALS — DIASTOLIC BLOOD PRESSURE: 80 MMHG | BODY MASS INDEX: 32.75 KG/M2 | SYSTOLIC BLOOD PRESSURE: 122 MMHG | WEIGHT: 196.8 LBS

## 2021-02-23 DIAGNOSIS — O28.3 ABNORMAL FETAL ULTRASOUND: ICD-10-CM

## 2021-02-23 DIAGNOSIS — O28.3 ECHOGENIC INTRACARDIAC FOCUS OF FETUS ON PRENATAL ULTRASOUND: Primary | ICD-10-CM

## 2021-02-23 DIAGNOSIS — O28.3 FETAL ECHOGENIC INTRACARDIAC FOCUS ON PRENATAL ULTRASOUND: ICD-10-CM

## 2021-02-23 PROCEDURE — 76811 OB US DETAILED SNGL FETUS: CPT

## 2021-02-23 PROCEDURE — 76811 OB US DETAILED SNGL FETUS: CPT | Performed by: OBSTETRICS & GYNECOLOGY

## 2021-02-23 PROCEDURE — 99241 PR OFFICE CONSULTATION NEW/ESTAB PATIENT 15 MIN: CPT | Performed by: OBSTETRICS & GYNECOLOGY

## 2021-02-23 RX ORDER — DOXYCYCLINE HYCLATE 50 MG/1
324 CAPSULE, GELATIN COATED ORAL
COMMUNITY

## 2021-02-23 RX ORDER — PRENATAL WITH FERROUS FUM AND FOLIC ACID 3080; 920; 120; 400; 22; 1.84; 3; 20; 10; 1; 12; 200; 27; 25; 2 [IU]/1; [IU]/1; MG/1; [IU]/1; MG/1; MG/1; MG/1; MG/1; MG/1; MG/1; UG/1; MG/1; MG/1; MG/1; MG/1
TABLET ORAL DAILY
COMMUNITY

## 2021-02-23 NOTE — PROGRESS NOTES
Patient seen in Maternal Fetal Medicine clinic today. Please see full note in under imaging tab of patient chart in Epic ([facility]point report).    Gloria Amaro MD

## 2021-02-23 NOTE — PROGRESS NOTES
Pt denies any issues at this time, had NIPT, reports results were negative.  Next appointment with Dr Waterman 3/4

## 2025-03-27 ENCOUNTER — OFFICE VISIT (OUTPATIENT)
Dept: CARDIOLOGY | Facility: CLINIC | Age: 35
End: 2025-03-27
Payer: COMMERCIAL

## 2025-03-27 VITALS
OXYGEN SATURATION: 98 % | HEIGHT: 65 IN | WEIGHT: 180 LBS | DIASTOLIC BLOOD PRESSURE: 101 MMHG | HEART RATE: 87 BPM | BODY MASS INDEX: 29.99 KG/M2 | SYSTOLIC BLOOD PRESSURE: 148 MMHG

## 2025-03-27 DIAGNOSIS — R00.2 PALPITATIONS: Primary | ICD-10-CM

## 2025-03-27 DIAGNOSIS — I10 PRIMARY HYPERTENSION: ICD-10-CM

## 2025-03-27 DIAGNOSIS — I34.1 MITRAL VALVE PROLAPSE: ICD-10-CM

## 2025-03-27 PROCEDURE — 99204 OFFICE O/P NEW MOD 45 MIN: CPT | Performed by: PHYSICIAN ASSISTANT

## 2025-03-27 RX ORDER — FLUVOXAMINE MALEATE 100 MG
100 TABLET ORAL 2 TIMES DAILY
COMMUNITY
Start: 2025-03-12

## 2025-03-27 RX ORDER — PROPRANOLOL HYDROCHLORIDE 10 MG/1
TABLET ORAL AS NEEDED
COMMUNITY
Start: 2025-03-13

## 2025-03-27 RX ORDER — LOSARTAN POTASSIUM 50 MG/1
50 TABLET ORAL DAILY
Qty: 30 TABLET | Refills: 5 | Status: SHIPPED | OUTPATIENT
Start: 2025-03-27 | End: 2025-04-07 | Stop reason: SDUPTHER

## 2025-03-27 RX ORDER — OZANIMOD HYDROCHLORIDE 0.92 MG/1
CAPSULE ORAL
COMMUNITY
Start: 2025-01-17

## 2025-03-27 NOTE — PROGRESS NOTES
Problem list     Subjective   Mike Briggs is a 34 y.o. female     Follow up-HTN  Problem List:     1. MVP, per patient report  1.1 Echo 6/21/16 - EF 55-60%; trace MR and TR  2. Atypical Chest Pain  2.1 Stress Test 6/21/16 - no ischemia; average exercise   3. Palpitations  3.1 Event Monitor 5/10-5/23/16 - NSR, sinus arrhythmia, ST   3.2 event monitor 5/10-5/23/18-sinus rhythm, sinus arrhythmia  4. Shortness of breath with exertion  5. MS, Dr. Gerald Tracy, DX 4/2017     HPI    Patient is a 34-year-old female presenting back to the face for  evaluation.  Patient has been evaluated in the past and has history of palpitations.  She also has history of mitral valve prolapse in the past.    Patient has had issues in regards to palpitations recently.  She has felt a fluttering type sensation and she also has noted her blood pressure being elevated as of late.  She has been concerned which is reason for evaluation.  She does not describe any dizziness, presyncope or syncope.    She does not complain of chest pain or dyspnea at all.  No complaints of PND orthopnea.    She otherwise is doing well.  She is concerned about her blood pressure and symptoms otherwise.  She is stable at this time.      Current Outpatient Medications on File Prior to Visit   Medication Sig Dispense Refill    cholecalciferol (VITAMIN D3) 1000 units tablet Take 2 tablets by mouth Daily.      ferrous gluconate (FERGON) 324 MG tablet Take 1 tablet by mouth Daily With Breakfast.      fluvoxaMINE (LUVOX) 100 MG tablet 1 tablet 2 (Two) Times a Day.      propranolol (INDERAL) 10 MG tablet As Needed.      Zeposia 0.92 MG capsule        No current facility-administered medications on file prior to visit.       Polytrim [polymyxin b-trimethoprim] and Macrobid [nitrofurantoin monohyd macro]    Past Medical History:   Diagnosis Date    Anxiety     Depression     zoloft    Heart palpitations 2018    Mitral valve prolapse     currently not taking medication  "   Multiple sclerosis 2017    OCD (obsessive compulsive disorder)        Social History     Socioeconomic History    Marital status:    Tobacco Use    Smoking status: Never    Smokeless tobacco: Never   Substance and Sexual Activity    Alcohol use: Not Currently     Comment: Socially twice a month    Drug use: No    Sexual activity: Yes     Partners: Male       Family History   Problem Relation Age of Onset    Hypertension Mother     Aneurysm Mother     No Known Problems Father     Aneurysm Maternal Grandmother     Diabetes Maternal Grandfather     Hypertension Maternal Grandfather     Diabetes Paternal Grandmother        Review of Systems   Constitutional:  Positive for fatigue. Negative for activity change, appetite change, chills and fever.   HENT:  Negative for congestion, sinus pressure and sinus pain.    Eyes: Negative.  Negative for visual disturbance.   Respiratory: Negative.  Negative for apnea, cough, chest tightness, shortness of breath and wheezing.    Cardiovascular:  Positive for palpitations. Negative for chest pain and leg swelling.   Gastrointestinal: Negative.  Negative for blood in stool.   Endocrine: Negative.  Negative for cold intolerance and heat intolerance.   Genitourinary: Negative.  Negative for hematuria.   Musculoskeletal: Negative.  Negative for gait problem.   Skin:  Negative for wound.   Allergic/Immunologic: Negative.  Negative for environmental allergies and food allergies.   Neurological:  Positive for headaches. Negative for dizziness, syncope and numbness.   Hematological: Negative.  Does not bruise/bleed easily.   Psychiatric/Behavioral: Negative.  Negative for sleep disturbance.        Objective   Vitals:    03/27/25 1514   BP: (!) 148/101   BP Location: Right arm   Patient Position: Sitting   Cuff Size: Adult   Pulse: 87   SpO2: 98%   Weight: 81.6 kg (180 lb)   Height: 165.1 cm (65\")      BP (!) 148/101 (BP Location: Right arm, Patient Position: Sitting, Cuff Size: " "Adult)   Pulse 87   Ht 165.1 cm (65\")   Wt 81.6 kg (180 lb)   SpO2 98%   BMI 29.95 kg/m²     Lab Results (most recent)       None            Physical Exam  Vitals and nursing note reviewed.   Constitutional:       General: She is not in acute distress.     Appearance: Normal appearance. She is well-developed.   HENT:      Head: Normocephalic and atraumatic.   Eyes:      General: No scleral icterus.        Right eye: No discharge.         Left eye: No discharge.      Conjunctiva/sclera: Conjunctivae normal.   Neck:      Vascular: No carotid bruit.   Cardiovascular:      Rate and Rhythm: Normal rate and regular rhythm.      Heart sounds: Normal heart sounds. No murmur heard.     No friction rub. No gallop.   Pulmonary:      Effort: Pulmonary effort is normal. No respiratory distress.      Breath sounds: Normal breath sounds. No wheezing or rales.   Chest:      Chest wall: No tenderness.   Musculoskeletal:      Right lower leg: No edema.      Left lower leg: No edema.   Skin:     General: Skin is warm and dry.      Coloration: Skin is not pale.      Findings: No erythema or rash.   Neurological:      Mental Status: She is alert and oriented to person, place, and time.      Cranial Nerves: No cranial nerve deficit.   Psychiatric:         Behavior: Behavior normal.         Procedure   Procedures       Assessment & Plan     Problems Addressed this Visit          Cardiac and Vasculature    Palpitations - Primary    Relevant Orders    Holter Monitor - 72 Hour Up To 15 Days    Primary hypertension    Relevant Medications    propranolol (INDERAL) 10 MG tablet    losartan (Cozaar) 50 MG tablet    Other Relevant Orders    Holter Monitor - 72 Hour Up To 15 Days    Mitral valve prolapse    Relevant Medications    propranolol (INDERAL) 10 MG tablet     Diagnoses         Codes Comments      Palpitations    -  Primary ICD-10-CM: R00.2  ICD-9-CM: 785.1       Primary hypertension     ICD-10-CM: I10  ICD-9-CM: 401.9       Mitral " valve prolapse     ICD-10-CM: I34.1  ICD-9-CM: 424.0               Recommendations  1.  Patient is a 34-year-old female presenting to the office for evaluation with complaints of palpitations.  I would like to schedule a Holter monitor.  Because of sporadic nature of palpitations, we will try to extend the monitor because of her symptoms.    2.  I am adding losartan to her regimen to help with blood pressure control.  She can call us in 1 week with readings as we can make adjustments telephonically if needed.    3.  History of mitral valve prolapse with follow-up echo showing trace or trivial MR.  We can monitor for now.    4.  We can see her back for follow-up after the above and recommend further.  Follow-up with primary as scheduled.         Mike Briggs  reports that she has never smoked. She has never used smokeless tobacco.     Patient did not bring med list or medicine bottles to appointment, med list has been reviewed and updated based on patient's knowledge of their meds.      Electronically signed by:

## 2025-03-27 NOTE — LETTER
March 31, 2025     DINA Knight  [address]  [place] [address]    Patient: Mike Briggs   YOB: 1990   Date of Visit: 3/27/2025       Dear DINA Knight,    Thank you for referring Mike Briggs to me for evaluation. Below is a copy of my consult note.    If you have questions, please do not hesitate to call me. I look forward to following Mike along with you.         Sincerely,        AVTAR Faria        CC: No Recipients    Problem list     Subjective  Mike Briggs is a 34 y.o. female     Follow up-HTN  Problem List:     1. MVP, per patient report  1.1 Echo 6/21/16 - EF 55-60%; trace MR and TR  2. Atypical Chest Pain  2.1 Stress Test 6/21/16 - no ischemia; average exercise   3. Palpitations  3.1 Event Monitor 5/10-5/23/16 - NSR, sinus arrhythmia, ST   3.2 event monitor 5/10-5/23/18-sinus rhythm, sinus arrhythmia  4. Shortness of breath with exertion  5. MS, Dr. Gerald Tracy, DX 4/2017     HPI    Patient is a 34-year-old female presenting back to the face for  evaluation.  Patient has been evaluated in the past and has history of palpitations.  She also has history of mitral valve prolapse in the past.    Patient has had issues in regards to palpitations recently.  She has felt a fluttering type sensation and she also has noted her blood pressure being elevated as of late.  She has been concerned which is reason for evaluation.  She does not describe any dizziness, presyncope or syncope.    She does not complain of chest pain or dyspnea at all.  No complaints of PND orthopnea.    She otherwise is doing well.  She is concerned about her blood pressure and symptoms otherwise.  She is stable at this time.      Current Outpatient Medications on File Prior to Visit   Medication Sig Dispense Refill   • cholecalciferol (VITAMIN D3) 1000 units tablet Take 2 tablets by mouth Daily.     • ferrous gluconate (FERGON) 324 MG tablet Take 1 tablet by mouth Daily With Breakfast.      • fluvoxaMINE (LUVOX) 100 MG tablet 1 tablet 2 (Two) Times a Day.     • propranolol (INDERAL) 10 MG tablet As Needed.     • Zeposia 0.92 MG capsule        No current facility-administered medications on file prior to visit.       Polytrim [polymyxin b-trimethoprim] and Macrobid [nitrofurantoin monohyd macro]    Past Medical History:   Diagnosis Date   • Anxiety    • Depression     zoloft   • Heart palpitations 2018   • Mitral valve prolapse     currently not taking medication   • Multiple sclerosis 2017   • OCD (obsessive compulsive disorder)        Social History     Socioeconomic History   • Marital status:    Tobacco Use   • Smoking status: Never   • Smokeless tobacco: Never   Substance and Sexual Activity   • Alcohol use: Not Currently     Comment: Socially twice a month   • Drug use: No   • Sexual activity: Yes     Partners: Male       Family History   Problem Relation Age of Onset   • Hypertension Mother    • Aneurysm Mother    • No Known Problems Father    • Aneurysm Maternal Grandmother    • Diabetes Maternal Grandfather    • Hypertension Maternal Grandfather    • Diabetes Paternal Grandmother        Review of Systems   Constitutional:  Positive for fatigue. Negative for activity change, appetite change, chills and fever.   HENT:  Negative for congestion, sinus pressure and sinus pain.    Eyes: Negative.  Negative for visual disturbance.   Respiratory: Negative.  Negative for apnea, cough, chest tightness, shortness of breath and wheezing.    Cardiovascular:  Positive for palpitations. Negative for chest pain and leg swelling.   Gastrointestinal: Negative.  Negative for blood in stool.   Endocrine: Negative.  Negative for cold intolerance and heat intolerance.   Genitourinary: Negative.  Negative for hematuria.   Musculoskeletal: Negative.  Negative for gait problem.   Skin:  Negative for wound.   Allergic/Immunologic: Negative.  Negative for environmental allergies and food allergies.  "  Neurological:  Positive for headaches. Negative for dizziness, syncope and numbness.   Hematological: Negative.  Does not bruise/bleed easily.   Psychiatric/Behavioral: Negative.  Negative for sleep disturbance.        Objective  Vitals:    03/27/25 1514   BP: (!) 148/101   BP Location: Right arm   Patient Position: Sitting   Cuff Size: Adult   Pulse: 87   SpO2: 98%   Weight: 81.6 kg (180 lb)   Height: 165.1 cm (65\")      BP (!) 148/101 (BP Location: Right arm, Patient Position: Sitting, Cuff Size: Adult)   Pulse 87   Ht 165.1 cm (65\")   Wt 81.6 kg (180 lb)   SpO2 98%   BMI 29.95 kg/m²     Lab Results (most recent)       None            Physical Exam  Vitals and nursing note reviewed.   Constitutional:       General: She is not in acute distress.     Appearance: Normal appearance. She is well-developed.   HENT:      Head: Normocephalic and atraumatic.   Eyes:      General: No scleral icterus.        Right eye: No discharge.         Left eye: No discharge.      Conjunctiva/sclera: Conjunctivae normal.   Neck:      Vascular: No carotid bruit.   Cardiovascular:      Rate and Rhythm: Normal rate and regular rhythm.      Heart sounds: Normal heart sounds. No murmur heard.     No friction rub. No gallop.   Pulmonary:      Effort: Pulmonary effort is normal. No respiratory distress.      Breath sounds: Normal breath sounds. No wheezing or rales.   Chest:      Chest wall: No tenderness.   Musculoskeletal:      Right lower leg: No edema.      Left lower leg: No edema.   Skin:     General: Skin is warm and dry.      Coloration: Skin is not pale.      Findings: No erythema or rash.   Neurological:      Mental Status: She is alert and oriented to person, place, and time.      Cranial Nerves: No cranial nerve deficit.   Psychiatric:         Behavior: Behavior normal.         Procedure  Procedures       Assessment & Plan    Problems Addressed this Visit          Cardiac and Vasculature    Palpitations - Primary    Relevant " Orders    Holter Monitor - 72 Hour Up To 15 Days    Primary hypertension    Relevant Medications    propranolol (INDERAL) 10 MG tablet    losartan (Cozaar) 50 MG tablet    Other Relevant Orders    Holter Monitor - 72 Hour Up To 15 Days    Mitral valve prolapse    Relevant Medications    propranolol (INDERAL) 10 MG tablet     Diagnoses         Codes Comments      Palpitations    -  Primary ICD-10-CM: R00.2  ICD-9-CM: 785.1       Primary hypertension     ICD-10-CM: I10  ICD-9-CM: 401.9       Mitral valve prolapse     ICD-10-CM: I34.1  ICD-9-CM: 424.0               Recommendations  1.  Patient is a 34-year-old female presenting to the office for evaluation with complaints of palpitations.  I would like to schedule a Holter monitor.  Because of sporadic nature of palpitations, we will try to extend the monitor because of her symptoms.    2.  I am adding losartan to her regimen to help with blood pressure control.  She can call us in 1 week with readings as we can make adjustments telephonically if needed.    3.  History of mitral valve prolapse with follow-up echo showing trace or trivial MR.  We can monitor for now.    4.  We can see her back for follow-up after the above and recommend further.  Follow-up with primary as scheduled.         Mike Briggs  reports that she has never smoked. She has never used smokeless tobacco.     Patient did not bring med list or medicine bottles to appointment, med list has been reviewed and updated based on patient's knowledge of their meds.      Electronically signed by:

## 2025-03-31 PROBLEM — I34.1 MITRAL VALVE PROLAPSE: Status: ACTIVE | Noted: 2025-03-31

## 2025-04-07 ENCOUNTER — TELEPHONE (OUTPATIENT)
Dept: CARDIOLOGY | Facility: CLINIC | Age: 35
End: 2025-04-07
Payer: COMMERCIAL

## 2025-04-07 RX ORDER — LOSARTAN POTASSIUM 50 MG/1
50 TABLET ORAL 2 TIMES DAILY
Qty: 180 TABLET | Refills: 3 | Status: SHIPPED | OUTPATIENT
Start: 2025-04-07

## 2025-04-07 NOTE — TELEPHONE ENCOUNTER
Patient called about the medication Losartan, her bp is still 130s/90s even on the losartan. No chest pain, no palpitations, please advise.

## 2025-04-07 NOTE — TELEPHONE ENCOUNTER
Lei Hoskins PA to Me (Selected Message)    4/7/25  4:03 PM  Take losartan twice a day.  Call back in 1 week with blood pressure readings

## 2025-04-21 ENCOUNTER — TELEPHONE (OUTPATIENT)
Dept: CARDIOLOGY | Facility: CLINIC | Age: 35
End: 2025-04-21
Payer: COMMERCIAL

## 2025-04-21 NOTE — TELEPHONE ENCOUNTER
Caller: Mike Briggs    Relationship: Self    Best call back number: [phone]    What is the best time to reach you: ANY    Who are you requesting to speak with (clinical staff, provider,  specific staff member): CLINICAL    What was the call regarding: PATIENT CALLED TO ASK CHRISS IF ITS OKAY THAT SHE TAKE SOME OTHER MEDICATIONS ALONG WITH LOSARTAN. THE MEDICATIONS IN QUESTION ARE MODAFINIL, ZOLOFT AND ABILIFY. PLEASE CALL HER BACK TO ADVISE. THANK YOU    Is it okay if the provider responds through [employer]: PLEASE CALL

## 2025-06-09 ENCOUNTER — TELEPHONE (OUTPATIENT)
Dept: CARDIOLOGY | Facility: CLINIC | Age: 35
End: 2025-06-09
Payer: COMMERCIAL

## 2025-06-09 RX ORDER — METOPROLOL TARTRATE 50 MG
50 TABLET ORAL DAILY
COMMUNITY
End: 2025-06-09

## 2025-06-09 RX ORDER — LOSARTAN POTASSIUM 50 MG/1
50 TABLET ORAL DAILY
COMMUNITY

## 2025-06-09 NOTE — TELEPHONE ENCOUNTER
Lei Hoskins PA to Me (Selected Message)    6/9/25  9:45 AM  Try just half a dose once daily and see how her blood pressure does.  Call back in 1 week with readings

## 2025-06-09 NOTE — TELEPHONE ENCOUNTER
Patient reports her blood pressure has been running low in the 90s over 60s or 100/60, she is feeling dizziness/light headedness and she would like to know if she can cut losartan to once daily with 1/2 tablet in the morning and 1/2 tablet in the evening.